# Patient Record
Sex: FEMALE | Race: WHITE | NOT HISPANIC OR LATINO | Employment: UNEMPLOYED | URBAN - NONMETROPOLITAN AREA
[De-identification: names, ages, dates, MRNs, and addresses within clinical notes are randomized per-mention and may not be internally consistent; named-entity substitution may affect disease eponyms.]

---

## 2021-04-30 LAB
EXTERNAL CHLAMYDIA SCREEN: NEGATIVE
EXTERNAL GONORRHEA SCREEN: NEGATIVE

## 2021-05-17 LAB
EXTERNAL ANTIBODY SCREEN: NEGATIVE
EXTERNAL HEPATITIS B SURFACE ANTIGEN: NEGATIVE
EXTERNAL HEPATITIS C AB: NORMAL
EXTERNAL SYPHILIS ANTIBODY: NORMAL
EXTERNAL SYPHILIS RPR SCREEN: NORMAL
HIV1 P24 AG SERPL QL IA: NORMAL

## 2021-10-27 LAB — EXTERNAL GROUP B STREP ANTIGEN: NEGATIVE

## 2021-10-28 ENCOUNTER — HOSPITAL ENCOUNTER (OUTPATIENT)
Facility: HOSPITAL | Age: 21
Setting detail: OBSERVATION
Discharge: HOME | End: 2021-10-28
Attending: OBSTETRICS & GYNECOLOGY | Admitting: OBSTETRICS & GYNECOLOGY

## 2021-10-28 VITALS
HEART RATE: 142 BPM | HEIGHT: 61 IN | WEIGHT: 113 LBS | BODY MASS INDEX: 21.34 KG/M2 | TEMPERATURE: 98.5 F | SYSTOLIC BLOOD PRESSURE: 107 MMHG | DIASTOLIC BLOOD PRESSURE: 84 MMHG | RESPIRATION RATE: 18 BRPM

## 2021-10-28 PROCEDURE — G0378 HOSPITAL OBSERVATION PER HR: HCPCS

## 2021-10-28 PROCEDURE — G0463 HOSPITAL OUTPT CLINIC VISIT: HCPCS

## 2021-10-28 RX ORDER — PRENATAL VIT NO.126/IRON/FOLIC 28MG-0.8MG
1 TABLET ORAL DAILY
COMMUNITY

## 2021-10-28 RX ORDER — FERROUS SULFATE 325(65) MG
325 TABLET ORAL
COMMUNITY

## 2021-10-29 PROBLEM — Z34.90 PREGNANCY: Status: ACTIVE | Noted: 2021-10-29

## 2021-11-02 ENCOUNTER — HOSPITAL ENCOUNTER (OUTPATIENT)
Facility: HOSPITAL | Age: 21
Discharge: HOME | End: 2021-11-02
Attending: OBSTETRICS & GYNECOLOGY | Admitting: OBSTETRICS & GYNECOLOGY

## 2021-11-02 VITALS
TEMPERATURE: 98.3 F | HEART RATE: 88 BPM | RESPIRATION RATE: 18 BRPM | SYSTOLIC BLOOD PRESSURE: 119 MMHG | DIASTOLIC BLOOD PRESSURE: 69 MMHG

## 2021-11-02 LAB — A1 MICROGLOB PLACENTAL VAG QL: NEGATIVE

## 2021-11-02 PROCEDURE — 84112 EVAL AMNIOTIC FLUID PROTEIN: CPT | Performed by: OBSTETRICS & GYNECOLOGY

## 2021-11-02 PROCEDURE — G0378 HOSPITAL OBSERVATION PER HR: HCPCS

## 2021-11-02 PROCEDURE — G0463 HOSPITAL OUTPT CLINIC VISIT: HCPCS

## 2021-11-06 ENCOUNTER — NURSE TRIAGE (OUTPATIENT)
Dept: CALL CENTER | Facility: HOSPITAL | Age: 21
End: 2021-11-06

## 2021-11-06 NOTE — TELEPHONE ENCOUNTER
"    Reason for Disposition  • Health Information question, no triage required and triager able to answer question    Additional Information  • Negative: [1] Caller is not with the adult (patient) AND [2] reporting urgent symptoms  • Negative: Lab result questions  • Negative: Medication questions  • Negative: Caller can't be reached by phone  • Negative: Caller has already spoken to PCP or another triager  • Negative: RN needs further essential information from caller in order to complete triage  • Negative: Requesting regular office appointment  • Negative: [1] Caller requesting NON-URGENT health information AND [2] PCP's office is the best resource    Answer Assessment - Initial Assessment Questions  1. REASON FOR CALL or QUESTION: \"What is your reason for calling today?\" or \"How can I best help you?\" or \"What question do you have that I can help answer?\"      Caller is asking how to sign up for an epidural.  Called Labor and Delivery and they state no sign up is necessary.  She will get an epidural if she wants one just come on in when it is time.    Protocols used: INFORMATION ONLY CALL - NO TRIAGE-ADULT-      "

## 2021-11-26 ENCOUNTER — HOSPITAL ENCOUNTER (OUTPATIENT)
Facility: HOSPITAL | Age: 21
Discharge: HOME | End: 2021-11-26
Attending: OBSTETRICS & GYNECOLOGY | Admitting: OBSTETRICS & GYNECOLOGY

## 2021-11-26 VITALS
TEMPERATURE: 97.4 F | HEIGHT: 61 IN | WEIGHT: 124.4 LBS | RESPIRATION RATE: 18 BRPM | HEART RATE: 84 BPM | SYSTOLIC BLOOD PRESSURE: 117 MMHG | DIASTOLIC BLOOD PRESSURE: 72 MMHG | BODY MASS INDEX: 23.49 KG/M2

## 2021-11-26 PROCEDURE — G0378 HOSPITAL OBSERVATION PER HR: HCPCS

## 2021-11-26 PROCEDURE — 59025 FETAL NON-STRESS TEST: CPT

## 2021-11-26 PROCEDURE — G0463 HOSPITAL OUTPT CLINIC VISIT: HCPCS

## 2021-11-30 ENCOUNTER — TRANSCRIBE ORDERS (OUTPATIENT)
Dept: LAB | Facility: HOSPITAL | Age: 21
End: 2021-11-30

## 2021-11-30 DIAGNOSIS — Z01.818 PREOPERATIVE TESTING: Primary | ICD-10-CM

## 2021-12-01 ENCOUNTER — ANESTHESIA EVENT (OUTPATIENT)
Dept: LABOR AND DELIVERY | Facility: HOSPITAL | Age: 21
End: 2021-12-01

## 2021-12-01 ENCOUNTER — LAB (OUTPATIENT)
Dept: LAB | Facility: HOSPITAL | Age: 21
End: 2021-12-01

## 2021-12-01 ENCOUNTER — HOSPITAL ENCOUNTER (INPATIENT)
Facility: HOSPITAL | Age: 21
LOS: 2 days | Discharge: HOME | End: 2021-12-03
Attending: OBSTETRICS & GYNECOLOGY | Admitting: OBSTETRICS & GYNECOLOGY

## 2021-12-01 ENCOUNTER — ANESTHESIA (OUTPATIENT)
Dept: LABOR AND DELIVERY | Facility: HOSPITAL | Age: 21
End: 2021-12-01

## 2021-12-01 DIAGNOSIS — Z98.891 STATUS POST PRIMARY LOW TRANSVERSE CESAREAN SECTION: Primary | ICD-10-CM

## 2021-12-01 LAB
ABO GROUP BLD: NORMAL
BLD GP AB SCN SERPL QL: NEGATIVE
DEPRECATED RDW RBC AUTO: 51.8 FL (ref 37–54)
ERYTHROCYTE [DISTWIDTH] IN BLOOD BY AUTOMATED COUNT: 15.7 % (ref 12.3–15.4)
HCT VFR BLD AUTO: 34 % (ref 34–46.6)
HGB BLD-MCNC: 11.6 G/DL (ref 12–15.9)
MCH RBC QN AUTO: 31 PG (ref 26.6–33)
MCHC RBC AUTO-ENTMCNC: 34.1 G/DL (ref 31.5–35.7)
MCV RBC AUTO: 90.9 FL (ref 79–97)
PLATELET # BLD AUTO: 274 10*3/MM3 (ref 140–450)
PMV BLD AUTO: 9.4 FL (ref 6–12)
RBC # BLD AUTO: 3.74 10*6/MM3 (ref 3.77–5.28)
RH BLD: POSITIVE
SARS-COV-2 RNA PNL SPEC NAA+PROBE: NOT DETECTED
T&S EXPIRATION DATE: NORMAL
WBC NRBC COR # BLD: 7.49 10*3/MM3 (ref 3.4–10.8)

## 2021-12-01 PROCEDURE — 51702 INSERT TEMP BLADDER CATH: CPT

## 2021-12-01 PROCEDURE — 25010000002 BUTORPHANOL PER 1 MG: Performed by: NURSE ANESTHETIST, CERTIFIED REGISTERED

## 2021-12-01 PROCEDURE — 25010000002 ONDANSETRON PER 1 MG: Performed by: NURSE ANESTHETIST, CERTIFIED REGISTERED

## 2021-12-01 PROCEDURE — 86900 BLOOD TYPING SEROLOGIC ABO: CPT | Performed by: OBSTETRICS & GYNECOLOGY

## 2021-12-01 PROCEDURE — 86850 RBC ANTIBODY SCREEN: CPT | Performed by: OBSTETRICS & GYNECOLOGY

## 2021-12-01 PROCEDURE — 25010000002 CEFAZOLIN PER 500 MG: Performed by: OBSTETRICS & GYNECOLOGY

## 2021-12-01 PROCEDURE — 86901 BLOOD TYPING SEROLOGIC RH(D): CPT | Performed by: OBSTETRICS & GYNECOLOGY

## 2021-12-01 PROCEDURE — 87635 SARS-COV-2 COVID-19 AMP PRB: CPT | Performed by: OBSTETRICS & GYNECOLOGY

## 2021-12-01 PROCEDURE — 25010000002 KETOROLAC TROMETHAMINE PER 15 MG: Performed by: NURSE ANESTHETIST, CERTIFIED REGISTERED

## 2021-12-01 PROCEDURE — C9803 HOPD COVID-19 SPEC COLLECT: HCPCS | Performed by: OBSTETRICS & GYNECOLOGY

## 2021-12-01 PROCEDURE — 25010000002 HYDROMORPHONE 1 MG/ML SOLUTION: Performed by: NURSE ANESTHETIST, CERTIFIED REGISTERED

## 2021-12-01 PROCEDURE — 85027 COMPLETE CBC AUTOMATED: CPT | Performed by: OBSTETRICS & GYNECOLOGY

## 2021-12-01 RX ORDER — TRISODIUM CITRATE DIHYDRATE AND CITRIC ACID MONOHYDRATE 500; 334 MG/5ML; MG/5ML
30 SOLUTION ORAL ONCE
Status: COMPLETED | OUTPATIENT
Start: 2021-12-01 | End: 2021-12-01

## 2021-12-01 RX ORDER — BUTORPHANOL TARTRATE 1 MG/ML
0.5 INJECTION, SOLUTION INTRAMUSCULAR; INTRAVENOUS EVERY 6 HOURS PRN
Status: DISCONTINUED | OUTPATIENT
Start: 2021-12-01 | End: 2021-12-03 | Stop reason: HOSPADM

## 2021-12-01 RX ORDER — MORPHINE SULFATE 2 MG/ML
2 INJECTION, SOLUTION INTRAMUSCULAR; INTRAVENOUS
Status: ACTIVE | OUTPATIENT
Start: 2021-12-01 | End: 2021-12-02

## 2021-12-01 RX ORDER — ONDANSETRON 2 MG/ML
4 INJECTION INTRAMUSCULAR; INTRAVENOUS ONCE AS NEEDED
Status: DISCONTINUED | OUTPATIENT
Start: 2021-12-01 | End: 2021-12-03 | Stop reason: HOSPADM

## 2021-12-01 RX ORDER — ONDANSETRON 2 MG/ML
INJECTION INTRAMUSCULAR; INTRAVENOUS AS NEEDED
Status: DISCONTINUED | OUTPATIENT
Start: 2021-12-01 | End: 2021-12-01 | Stop reason: SURG

## 2021-12-01 RX ORDER — ONDANSETRON 4 MG/1
4 TABLET, FILM COATED ORAL EVERY 6 HOURS PRN
Status: DISCONTINUED | OUTPATIENT
Start: 2021-12-01 | End: 2021-12-01 | Stop reason: HOSPADM

## 2021-12-01 RX ORDER — MORPHINE SULFATE 2 MG/ML
2 INJECTION, SOLUTION INTRAMUSCULAR; INTRAVENOUS EVERY 4 HOURS PRN
Status: DISCONTINUED | OUTPATIENT
Start: 2021-12-02 | End: 2021-12-03 | Stop reason: HOSPADM

## 2021-12-01 RX ORDER — SODIUM CHLORIDE 0.9 % (FLUSH) 0.9 %
10 SYRINGE (ML) INJECTION AS NEEDED
Status: DISCONTINUED | OUTPATIENT
Start: 2021-12-01 | End: 2021-12-01 | Stop reason: HOSPADM

## 2021-12-01 RX ORDER — OXYCODONE AND ACETAMINOPHEN 10; 325 MG/1; MG/1
1 TABLET ORAL EVERY 4 HOURS PRN
Status: DISCONTINUED | OUTPATIENT
Start: 2021-12-02 | End: 2021-12-03 | Stop reason: HOSPADM

## 2021-12-01 RX ORDER — SODIUM CHLORIDE 0.9 % (FLUSH) 0.9 %
3 SYRINGE (ML) INJECTION EVERY 12 HOURS SCHEDULED
Status: DISCONTINUED | OUTPATIENT
Start: 2021-12-01 | End: 2021-12-01 | Stop reason: HOSPADM

## 2021-12-01 RX ORDER — MISOPROSTOL 200 UG/1
800 TABLET ORAL AS NEEDED
Status: DISCONTINUED | OUTPATIENT
Start: 2021-12-01 | End: 2021-12-01 | Stop reason: HOSPADM

## 2021-12-01 RX ORDER — PROMETHAZINE HYDROCHLORIDE 25 MG/1
12.5 TABLET ORAL EVERY 6 HOURS PRN
Status: DISCONTINUED | OUTPATIENT
Start: 2021-12-01 | End: 2021-12-01 | Stop reason: HOSPADM

## 2021-12-01 RX ORDER — CARBOPROST TROMETHAMINE 250 UG/ML
250 INJECTION, SOLUTION INTRAMUSCULAR AS NEEDED
Status: DISCONTINUED | OUTPATIENT
Start: 2021-12-01 | End: 2021-12-01 | Stop reason: HOSPADM

## 2021-12-01 RX ORDER — ONDANSETRON 2 MG/ML
4 INJECTION INTRAMUSCULAR; INTRAVENOUS EVERY 6 HOURS PRN
Status: DISCONTINUED | OUTPATIENT
Start: 2021-12-01 | End: 2021-12-01 | Stop reason: HOSPADM

## 2021-12-01 RX ORDER — SIMETHICONE 80 MG
80 TABLET,CHEWABLE ORAL 4 TIMES DAILY PRN
Status: DISCONTINUED | OUTPATIENT
Start: 2021-12-01 | End: 2021-12-03 | Stop reason: HOSPADM

## 2021-12-01 RX ORDER — OXYTOCIN/0.9 % SODIUM CHLORIDE 30/500 ML
250 PLASTIC BAG, INJECTION (ML) INTRAVENOUS CONTINUOUS
Status: ACTIVE | OUTPATIENT
Start: 2021-12-01 | End: 2021-12-01

## 2021-12-01 RX ORDER — SODIUM CHLORIDE, SODIUM LACTATE, POTASSIUM CHLORIDE, CALCIUM CHLORIDE 600; 310; 30; 20 MG/100ML; MG/100ML; MG/100ML; MG/100ML
125 INJECTION, SOLUTION INTRAVENOUS CONTINUOUS
Status: DISCONTINUED | OUTPATIENT
Start: 2021-12-01 | End: 2021-12-03 | Stop reason: HOSPADM

## 2021-12-01 RX ORDER — METHYLERGONOVINE MALEATE 0.2 MG/ML
200 INJECTION INTRAVENOUS AS NEEDED
Status: DISCONTINUED | OUTPATIENT
Start: 2021-12-01 | End: 2021-12-01 | Stop reason: HOSPADM

## 2021-12-01 RX ORDER — NALOXONE HCL 0.4 MG/ML
0.4 VIAL (ML) INJECTION
Status: DISCONTINUED | OUTPATIENT
Start: 2021-12-01 | End: 2021-12-03 | Stop reason: HOSPADM

## 2021-12-01 RX ORDER — ACETAMINOPHEN 325 MG/1
650 TABLET ORAL EVERY 6 HOURS PRN
Status: ACTIVE | OUTPATIENT
Start: 2021-12-01 | End: 2021-12-02

## 2021-12-01 RX ORDER — PROMETHAZINE HYDROCHLORIDE 12.5 MG/1
12.5 SUPPOSITORY RECTAL EVERY 6 HOURS PRN
Status: DISCONTINUED | OUTPATIENT
Start: 2021-12-01 | End: 2021-12-01 | Stop reason: HOSPADM

## 2021-12-01 RX ORDER — PROMETHAZINE HYDROCHLORIDE 25 MG/1
25 TABLET ORAL EVERY 6 HOURS PRN
Status: DISCONTINUED | OUTPATIENT
Start: 2021-12-01 | End: 2021-12-03 | Stop reason: HOSPADM

## 2021-12-01 RX ORDER — BUPIVACAINE HYDROCHLORIDE 7.5 MG/ML
INJECTION, SOLUTION EPIDURAL; RETROBULBAR AS NEEDED
Status: DISCONTINUED | OUTPATIENT
Start: 2021-12-01 | End: 2021-12-01 | Stop reason: SURG

## 2021-12-01 RX ORDER — BUPIVACAINE HCL/0.9 % NACL/PF 0.1 %
2 PLASTIC BAG, INJECTION (ML) EPIDURAL ONCE
Status: COMPLETED | OUTPATIENT
Start: 2021-12-01 | End: 2021-12-01

## 2021-12-01 RX ORDER — OXYTOCIN/0.9 % SODIUM CHLORIDE 30/500 ML
999 PLASTIC BAG, INJECTION (ML) INTRAVENOUS ONCE
Status: DISCONTINUED | OUTPATIENT
Start: 2021-12-01 | End: 2021-12-01 | Stop reason: HOSPADM

## 2021-12-01 RX ORDER — OXYCODONE HYDROCHLORIDE AND ACETAMINOPHEN 5; 325 MG/1; MG/1
1 TABLET ORAL EVERY 4 HOURS PRN
Status: DISCONTINUED | OUTPATIENT
Start: 2021-12-02 | End: 2021-12-03 | Stop reason: HOSPADM

## 2021-12-01 RX ORDER — HYDROMORPHONE HYDROCHLORIDE 1 MG/ML
0.5 INJECTION, SOLUTION INTRAMUSCULAR; INTRAVENOUS; SUBCUTANEOUS
Status: DISCONTINUED | OUTPATIENT
Start: 2021-12-01 | End: 2021-12-01 | Stop reason: HOSPADM

## 2021-12-01 RX ORDER — MORPHINE SULFATE 2 MG/ML
2 INJECTION, SOLUTION INTRAMUSCULAR; INTRAVENOUS
Status: DISCONTINUED | OUTPATIENT
Start: 2021-12-01 | End: 2021-12-01

## 2021-12-01 RX ORDER — LIDOCAINE 50 MG/G
1 PATCH TOPICAL
Status: DISCONTINUED | OUTPATIENT
Start: 2021-12-01 | End: 2021-12-03 | Stop reason: HOSPADM

## 2021-12-01 RX ORDER — PROMETHAZINE HYDROCHLORIDE 12.5 MG/1
12.5 SUPPOSITORY RECTAL EVERY 6 HOURS PRN
Status: DISCONTINUED | OUTPATIENT
Start: 2021-12-01 | End: 2021-12-03 | Stop reason: HOSPADM

## 2021-12-01 RX ORDER — OXYTOCIN/0.9 % SODIUM CHLORIDE 30/500 ML
125 PLASTIC BAG, INJECTION (ML) INTRAVENOUS CONTINUOUS PRN
Status: DISCONTINUED | OUTPATIENT
Start: 2021-12-01 | End: 2021-12-01 | Stop reason: HOSPADM

## 2021-12-01 RX ORDER — KETOROLAC TROMETHAMINE 30 MG/ML
30 INJECTION, SOLUTION INTRAMUSCULAR; INTRAVENOUS EVERY 6 HOURS PRN
Status: DISPENSED | OUTPATIENT
Start: 2021-12-01 | End: 2021-12-02

## 2021-12-01 RX ORDER — FAMOTIDINE 10 MG/ML
20 INJECTION, SOLUTION INTRAVENOUS ONCE AS NEEDED
Status: COMPLETED | OUTPATIENT
Start: 2021-12-01 | End: 2021-12-01

## 2021-12-01 RX ORDER — OXYCODONE AND ACETAMINOPHEN 7.5; 325 MG/1; MG/1
1 TABLET ORAL EVERY 4 HOURS PRN
Status: DISPENSED | OUTPATIENT
Start: 2021-12-01 | End: 2021-12-02

## 2021-12-01 RX ORDER — OXYCODONE AND ACETAMINOPHEN 7.5; 325 MG/1; MG/1
2 TABLET ORAL EVERY 4 HOURS PRN
Status: ACTIVE | OUTPATIENT
Start: 2021-12-01 | End: 2021-12-02

## 2021-12-01 RX ORDER — OXYTOCIN 10 [USP'U]/ML
INJECTION, SOLUTION INTRAMUSCULAR; INTRAVENOUS AS NEEDED
Status: DISCONTINUED | OUTPATIENT
Start: 2021-12-01 | End: 2021-12-01 | Stop reason: SURG

## 2021-12-01 RX ORDER — KETOROLAC TROMETHAMINE 30 MG/ML
30 INJECTION, SOLUTION INTRAMUSCULAR; INTRAVENOUS 4 TIMES DAILY
Status: DISCONTINUED | OUTPATIENT
Start: 2021-12-01 | End: 2021-12-01 | Stop reason: SDUPTHER

## 2021-12-01 RX ORDER — LIDOCAINE HYDROCHLORIDE 10 MG/ML
5 INJECTION, SOLUTION EPIDURAL; INFILTRATION; INTRACAUDAL; PERINEURAL AS NEEDED
Status: DISCONTINUED | OUTPATIENT
Start: 2021-12-01 | End: 2021-12-01 | Stop reason: HOSPADM

## 2021-12-01 RX ORDER — IBUPROFEN 600 MG/1
600 TABLET ORAL EVERY 6 HOURS PRN
Status: DISCONTINUED | OUTPATIENT
Start: 2021-12-02 | End: 2021-12-03 | Stop reason: HOSPADM

## 2021-12-01 RX ADMIN — KETOROLAC TROMETHAMINE 30 MG: 30 INJECTION, SOLUTION INTRAMUSCULAR; INTRAVENOUS at 19:49

## 2021-12-01 RX ADMIN — BUPIVACAINE HYDROCHLORIDE 1.4 ML: 7.5 INJECTION, SOLUTION EPIDURAL; RETROBULBAR at 12:30

## 2021-12-01 RX ADMIN — HYDROMORPHONE HYDROCHLORIDE 100 MCG: 1 INJECTION, SOLUTION INTRAMUSCULAR; INTRAVENOUS; SUBCUTANEOUS at 12:30

## 2021-12-01 RX ADMIN — ONDANSETRON 4 MG: 2 INJECTION INTRAMUSCULAR; INTRAVENOUS at 12:27

## 2021-12-01 RX ADMIN — SODIUM CITRATE AND CITRIC ACID MONOHYDRATE 30 ML: 500; 334 SOLUTION ORAL at 11:48

## 2021-12-01 RX ADMIN — HYDROMORPHONE HYDROCHLORIDE 900 MCG: 1 INJECTION, SOLUTION INTRAMUSCULAR; INTRAVENOUS; SUBCUTANEOUS at 13:00

## 2021-12-01 RX ADMIN — SODIUM CHLORIDE, POTASSIUM CHLORIDE, SODIUM LACTATE AND CALCIUM CHLORIDE 125 ML/HR: 600; 310; 30; 20 INJECTION, SOLUTION INTRAVENOUS at 10:02

## 2021-12-01 RX ADMIN — OXYCODONE AND ACETAMINOPHEN 1 TABLET: 7.5; 325 TABLET ORAL at 22:30

## 2021-12-01 RX ADMIN — FAMOTIDINE 20 MG: 10 INJECTION INTRAVENOUS at 11:48

## 2021-12-01 RX ADMIN — OXYTOCIN 30 UNITS: 10 INJECTION, SOLUTION INTRAMUSCULAR; INTRAVENOUS at 12:54

## 2021-12-01 RX ADMIN — BUTORPHANOL TARTRATE 0.5 MG: 1 INJECTION, SOLUTION INTRAMUSCULAR; INTRAVENOUS at 17:15

## 2021-12-01 RX ADMIN — Medication 2 G: at 12:26

## 2021-12-01 NOTE — OP NOTE
Section Procedure Note    Indications: failure to progress: arrest of descent, failure to progress: arrest of dilation and macrosomia    Pre-operative Diagnosis: 41 week 0 day pregnancy.    Post-operative Diagnosis: same    Surgeon: Odilon Rosenberg MD     Assistants: n/a    Anesthesia: Spinal anesthesia    ASA Class: 1    Procedure Details   The patient was seen in the Holding Room. The risks, benefits, complications, treatment options, and expected outcomes were discussed with the patient.  The patient concurred with the proposed plan, giving informed consent.  The site of surgery properly noted/marked. The patient was taken to Operating Room # 1, identified as Emmy Llamas and the procedure verified as  Delivery. A Time Out was held and the above information confirmed.    After induction of anesthesia, the patient was draped and prepped in the usual sterile manner. A Pfannenstiel incision was made and carried down through the subcutaneous tissue to the fascia. Fascial incision was made and extended transversely. The fascia was  from the underlying rectus tissue superiorly and inferiorly. The peritoneum was identified and entered. Peritoneal incision was extended longitudinally. The utero-vesical peritoneal reflection was incised transversely and the bladder flap was bluntly freed from the lower uterine segment. A low transverse uterine incision was made. Delivered from vertex presentation was a female  gram n/a with Apgar scores of 8 at one minute and 9 at five minutes. After the umbilical cord was clamped and cut cord blood was obtained for evaluation. The placenta was removed intact and appeared normal. The uterine outline, tubes and ovaries appeared normal. The uterine incision was closed with running locked sutures of 0 Vicryl. Hemostasis was observed. Lavage was carried out until clear. The fascia was then reapproximated with running sutures of 0 Vicryl. The skin was  reapproximated with staples.    Instrument, sponge, and needle counts were correct prior the abdominal closure and at the conclusion of the case.     Findings:  Female infant, apgar 8-9, tube and ovaries nl, placenta intact , 3 vessels    Estimated Blood Loss:  600 mls           Drains: n/a           Total IV Fluids: n/aml           Specimens: n/a           Implants: none           Complications:  None; patient tolerated the procedure well.           Disposition: PACU - hemodynamically stable.           Condition: stable    Attending Attestation: I performed the procedure.

## 2021-12-01 NOTE — ANESTHESIA PROCEDURE NOTES
Spinal Block      Patient reassessed immediately prior to procedure    Patient location during procedure: OB  Start Time: 12/1/2021 12:26 PM  Stop Time: 12/1/2021 12:30 PM  Indication:procedure for pain  Performed By  CRNA: Umang Godinez CRNA  Preanesthetic Checklist  Completed: patient identified, IV checked, site marked, risks and benefits discussed, surgical consent, monitors and equipment checked, pre-op evaluation and timeout performed  Spinal Block Prep:  Patient Position:sitting  Sterile Tech:gloves, mask and sterile barriers  Prep:Chloraprep  Patient Monitoring:blood pressure monitoring and continuous pulse oximetry  Spinal Block Procedure  Approach:midline  Guidance:palpation technique  Location:L4-L5  Needle Type:Pencan  Needle Gauge:25 G  Placement of Spinal needle event:cerebrospinal fluid aspirated  Paresthesia: no  Fluid Appearance:clear     Post Assessment  Patient Tolerance:patient tolerated the procedure well with no apparent complications  Complications no

## 2021-12-01 NOTE — H&P
"[facility]   Emmy Llamas  : 2000  MRN: [id number]  CSN: [id number]    History and Physical    Subjective   Emmy Llamas is a 21 y.o.  who presented to labor and delivery for elective primary c/s. Pt is 41 weeks, efw 3600 grams and head is still out of the pelvis. The pt s condition was discussed with pt and family, rba given, choices given, pt declines induction and planning a primary c/s due to head has not entered the pelvic bone.    Past Medical History:   Diagnosis Date   • Dizziness      History reviewed. No pertinent surgical history.  Social History    Tobacco Use      Smoking status: Never Smoker      Smokeless tobacco: Not on file      Current Facility-Administered Medications:   •  ceFAZolin in 0.9% normal saline (ANCEF) IVPB solution 2 g, 2 g, Intravenous, Once, Odilon Rosenberg MD  •  lactated ringers bolus 1,000 mL, 1,000 mL, Intravenous, Once, Odilon Rosenberg MD  •  lactated ringers infusion, 125 mL/hr, Intravenous, Continuous, Odilon Rosenberg MD, Last Rate: 999 mL/hr at 21 1147, 999 mL/hr at 21 1147  •  lidocaine (LIDODERM) 5 % 1 patch, 1 patch, Transdermal, Q24H, Odilon Rosenebrg MD  •  lidocaine PF 1% (XYLOCAINE) injection 5 mL, 5 mL, Intradermal, PRN, Odilon Rosenberg MD  •  sodium chloride 0.9 % flush 10 mL, 10 mL, Intravenous, PRN, Odilon Rosenberg MD  •  sodium chloride 0.9 % flush 3 mL, 3 mL, Intravenous, Q12H, Odilon Rosenberg MD    No Known Allergies    Review of Systems      Objective   Ht 154.9 cm (61\")   Wt 55.3 kg (122 lb)   Breastfeeding Yes   BMI 23.05 kg/m²   General: well developed; well nourished  no acute distress   Heart: regular rate and rhythm, S1, S2 normal, no murmur, click, rub or gallop   Lungs: breathing is unlabored   Abdomen: soft, non-tender; no masses  no umbilical or inguinal hernias are present  no hepato-splenomegaly   Pelvis:: Clinical staff was present for exam, cervix long closed, floating presenting part "   Labs  Lab Results   Component Value Date     12/01/2021    HGB 11.6 (L) 12/01/2021    HCT 34.0 12/01/2021    WBC 7.49 12/01/2021        Assessment   1. 41 week gestation with cephalo-pelvic disproportion    The risks, benefits, and alternatives of the procedure; along with the risks of anesthesia was discussed in full with the patient and family and all questions were answered.       Plan   1. Primary c/s    Odilon Rosenberg MD  12/1/2021  11:54 CST

## 2021-12-01 NOTE — ANESTHESIA PREPROCEDURE EVALUATION
Anesthesia Evaluation     Patient summary reviewed and Nursing notes reviewed   NPO Solid Status: > 8 hours  NPO Liquid Status: > 8 hours           Airway   Mallampati: I  TM distance: >3 FB  Neck ROM: full  No difficulty expected  Dental - normal exam     Pulmonary - negative pulmonary ROS   Cardiovascular - negative cardio ROS        Neuro/Psych- negative ROS  GI/Hepatic/Renal/Endo - negative ROS     Musculoskeletal (-) negative ROS    Abdominal    Substance History - negative use     OB/GYN    (+) Pregnant,         Other                      Anesthesia Plan    ASA 2     spinal       Anesthetic plan, all risks, benefits, and alternatives have been provided, discussed and informed consent has been obtained with: patient.  Use of blood products discussed with patient  Consented to blood products.

## 2021-12-01 NOTE — LACTATION NOTE
Mother's Name: Emmy  Phone #:  Infant Name: Marli  :2022  Gestation:40w6d  Day of life:0  Birth weight:  8-12.7 (3990g) Discharge weight:  Weight Loss:   24 hour Summary of Feeds:  Voids:  Stools:  Assistive devices (shields, shells, etc):16 mm nipple shield  Significant Maternal history:, dizziness  Maternal Concerns:  denies  Maternal Goal: unsure of goals  Mother's Medications: Iron, PNV  Breastpump for home: NEEDS RX   Ped follow up appt:    Called to LDR recovery to assist with first feeding. ADT RN reports infant has fed for 10 mins on first breast but difficulty latching on second breast. Assisted to position infant in ventral and football hold but infant unable to maintain latch. Breast round and taunt, difficult for infant to stay latched. Moved to cross cradle hold and nipple shield applied. Infant latched and successfully nursed for 10 mins before self releasing from breast. Burped and moved infant back to left breast in football hold with shield in place. Infant nursed additional 10 mins, self released and presents with satiety cues. Moved infant to rest skin to skin on mother's chest. Mother difficulty focusing on education provided. Education directed mainly towards FOB and mother's step mom. Breastfeeding book also given. Reiterated importance of on demand feedings, waking to offer feedings every 3 hours, expected voids/stools, encouraged hand expression and skin to skin bonding/feedings. Questions denied at this time. Encouragement and support provided.       Instructed mom our lactation team is here for continued support throughout their breastfeeding journey. Our team has encouraged mom to call with any questions or concerns that may arise after discharge.    1700  Called back to room for assistance with feeding. Assisted to unswaddle infant, stooled diaper changed and moved infant to right breast in football hold with nipple shield in place. Infant eager. Minimal effort required  to achieve latch. Infant nursed well for 10 mins, gulping swallows noted at times with deep jaw dropping sucks. Moved to left breast, infant had 3 episodes of pushing away from breast, 2 large burps exhibited and then infant would go back to breast. Total of 18 mins on left breast with signs of great feeding. Infant released from breast, calm but alert. Assisted to swaddle infant, infant began refluxing, assisted infant to recover and then placed back in crib. Mother remains groggy, difficult to teach. Highly encouraged mother to attempt sitting up in chair when safe to ambulate, to breastfeed more independently. Also encouraged parents to watch youtube videos 1-3 tonight.

## 2021-12-02 LAB
HCT VFR BLD AUTO: 29.2 % (ref 34–46.6)
HGB BLD-MCNC: 9.4 G/DL (ref 12–15.9)

## 2021-12-02 PROCEDURE — 25010000002 IRON SUCROSE PER 1 MG: Performed by: OBSTETRICS & GYNECOLOGY

## 2021-12-02 PROCEDURE — 85014 HEMATOCRIT: CPT | Performed by: OBSTETRICS & GYNECOLOGY

## 2021-12-02 PROCEDURE — 25010000002 KETOROLAC TROMETHAMINE PER 15 MG: Performed by: NURSE ANESTHETIST, CERTIFIED REGISTERED

## 2021-12-02 PROCEDURE — 85018 HEMOGLOBIN: CPT | Performed by: OBSTETRICS & GYNECOLOGY

## 2021-12-02 RX ADMIN — SODIUM CHLORIDE, POTASSIUM CHLORIDE, SODIUM LACTATE AND CALCIUM CHLORIDE 125 ML/HR: 600; 310; 30; 20 INJECTION, SOLUTION INTRAVENOUS at 01:44

## 2021-12-02 RX ADMIN — OXYCODONE AND ACETAMINOPHEN 1 TABLET: 7.5; 325 TABLET ORAL at 10:34

## 2021-12-02 RX ADMIN — KETOROLAC TROMETHAMINE 30 MG: 30 INJECTION, SOLUTION INTRAMUSCULAR; INTRAVENOUS at 05:20

## 2021-12-02 RX ADMIN — IRON SUCROSE 400 MG: 20 INJECTION, SOLUTION INTRAVENOUS at 15:47

## 2021-12-02 RX ADMIN — OXYCODONE HYDROCHLORIDE AND ACETAMINOPHEN 1 TABLET: 5; 325 TABLET ORAL at 15:47

## 2021-12-02 RX ADMIN — LIDOCAINE 1 PATCH: 50 PATCH CUTANEOUS at 10:34

## 2021-12-02 RX ADMIN — OXYCODONE AND ACETAMINOPHEN 1 TABLET: 7.5; 325 TABLET ORAL at 05:20

## 2021-12-02 RX ADMIN — OXYCODONE AND ACETAMINOPHEN 1 TABLET: 325; 10 TABLET ORAL at 20:03

## 2021-12-02 RX ADMIN — IBUPROFEN 600 MG: 600 TABLET ORAL at 18:25

## 2021-12-02 NOTE — PLAN OF CARE
Goal Outcome Evaluation:  Plan of Care Reviewed With: patient, spouse        Progress: improving  Outcome Summary: VSS. ff/ml/uu. scant lochia. jordan cath in place. stadol given for itching. breastfeeding infant

## 2021-12-02 NOTE — LACTATION NOTE
"Mother's Name: Emmy  Phone #:  Infant Name: Marli  :2022  Gestation:40w6d  Day of life:1  Birth weight:  8-12.7 (3990g) Discharge weight:  Weight Loss: -1.58%  24 hour Summary of Feeds: 5BF  EBM 0.5 ml  Voids: 3 Stools:3  Assistive devices (shields, shells, etc):16 mm nipple shield  Significant Maternal history:, dizziness  Maternal Concerns:  denies  Maternal Goal: unsure of goals  Mother's Medications: Iron, PNV  Breastpump for home: NEEDS RX   Ped follow up appt:    Follow up with mother to discuss breastfeeding progress. Both parents state infant doing well breastfeeding, has been latching last 2-3 feedings without nipple shield. Last feeding at 1000, infant due to feed now. Encouraged feeding at this time and offered assistance/observation for feeding. Assisted to undress infant, waking then moved to mother. Mother independently positioned infant to left breast in cross cradle hold appropriate, praise provided. Mother attempts latching without shield, infant shallow latching, sucking lips inward causing \"smacking\" sounds. Multiple attempts made. Recommended applying shield. Infant able to maintain latch more easily with shield in place but mother having difficulty keeping infant supported at the breast. Reiterated many times to keep infant pressed firmly to breast with palm of hand into mid back, mother continuously allowing infant to slip back. Requiring assistance for support at breast. Moved to right breast in football hold. Mother keeping infant supported at breast better but still with frequent unlatching events. After 30 mins of attempting to feed, infant showing signs of fatigue, recommended hand expression at this time. Infant dressed and placed in crib, assisted mother with hand expression, collected 2.3 ml and provided to infant via syringe/finger suck training. Education provided to mother on use of hand pump although not necessary to use at this time. Recommend utilizing hand " expression to collect colostrum vs pumping. Parents deny questions. Encouragement and support provided.     Instructed mom our lactation team is here for continued support throughout their breastfeeding journey. Our team has encouraged mom to call with any questions or concerns that may arise after discharge.

## 2021-12-02 NOTE — ANESTHESIA POSTPROCEDURE EVALUATION
"Patient: Emmy Moya    Procedure Summary     Date: 21 Room / Location: [facility] LABOR DELIVERY  /  [facility] LABOR DELIVERY    Anesthesia Start: 1226 Anesthesia Stop:     Procedure: PRIMARY  SECTION (N/A Abdomen) Diagnosis: (OBSTRICTED LABOR DUE TO FETOPELVIC DISPROPORTION)    Surgeons: Odilon Rosenberg MD Provider: Umang Godinez CRNA    Anesthesia Type: spinal ASA Status: 2          Anesthesia Type: spinal    Vitals  Vitals Value Taken Time   /59 21 1158   Temp 98.3 °F (36.8 °C) 21 1158   Pulse 77 21 1158   Resp 18 21 1158   SpO2 99 % 21 1158           Post Anesthesia Care and Evaluation    Patient location during evaluation: PHASE II  Patient participation: complete - patient participated  Level of consciousness: awake and alert  Pain management: adequate  Airway patency: patent  Anesthetic complications: No anesthetic complications    Cardiovascular status: acceptable  Respiratory status: acceptable  Hydration status: acceptable    Comments: Blood pressure 117/59, pulse 77, temperature 98.3 °F (36.8 °C), temperature source Oral, resp. rate 18, height 154.9 cm (61\"), weight 55.3 kg (122 lb), SpO2 99 %, currently breastfeeding.    Pt discharged from PACU based on ana m score >8      "

## 2021-12-02 NOTE — PAYOR COMM NOTE
"REF:  [id number]    [facility]  [place],  [phone]  OR  FAX  [phone]     Rosario Moya (21 y.o. Female)             Date of Birth Social Security Number Address Home Phone MRN    2000  [address] [address] [phone] [id number]    Synagogue Marital Status             Mormon        Admission Date Admission Type Admitting Provider Attending Provider Department, Room/Bed    21 Elective Keith Medina MD Cardenas, Jorge M, MD [facility] MOTHER BABY 2A, M239/    Discharge Date Discharge Disposition Discharge Destination                         Attending Provider: Odilon Rosenberg MD    Allergies: No Known Allergies    Isolation: None   Infection: None   Code Status: CPR   Advance Care Planning Activity    Ht: 154.9 cm (61\")   Wt: 55.3 kg (122 lb)    Admission Cmt: None   Principal Problem: None                Active Insurance as of 2021     Primary Coverage     Payor Plan Insurance Group Employer/Plan Group    [place] [id number]     Payor Plan Address Payor Plan Phone Number Payor Plan Fax Number Effective Dates    [employer] [address] [address] [phone]  10/1/2021 - None Entered    [facility] [address]       Subscriber Name Subscriber Birth Date Member ID       ROSARIO MOYA 2000 [id number]                 Emergency Contacts      (Rel.) Home Phone Work Phone Mobile Phone    PAULINA DOSS (Spouse) [phone] -- --        EDC 2021    G-1 P-0     41 WEEKS GESTATIONAL AGE     2021 AT 12:52 PM         FEMALE   APGAR  8/9    WEIGHT 3990 GRAMS            History & Physical      Odilon Rosenberg MD at 21 1153           Anna Doss  : 2000  MRN: [id number]  CSN: [id number]    History and Physical    Subjective   Roasrio Doss is a 21 y.o.  who presented to labor and delivery for elective primary c/s. Pt is 41 weeks, " "efw 3600 grams and head is still out of the pelvis. The pt s condition was discussed with pt and family, rba given, choices given, pt declines induction and planning a primary c/s due to head has not entered the pelvic bone.    Past Medical History:   Diagnosis Date   • Dizziness      History reviewed. No pertinent surgical history.  Social History    Tobacco Use      Smoking status: Never Smoker      Smokeless tobacco: Not on file      Current Facility-Administered Medications:   •  ceFAZolin in 0.9% normal saline (ANCEF) IVPB solution 2 g, 2 g, Intravenous, Once, Odilon Rosenberg MD  •  lactated ringers bolus 1,000 mL, 1,000 mL, Intravenous, Once, Odilon Rosenberg MD  •  lactated ringers infusion, 125 mL/hr, Intravenous, Continuous, Odilon Rosenberg MD, Last Rate: 999 mL/hr at 12/01/21 1147, 999 mL/hr at 12/01/21 1147  •  lidocaine (LIDODERM) 5 % 1 patch, 1 patch, Transdermal, Q24H, Odilon Rosenberg MD  •  lidocaine PF 1% (XYLOCAINE) injection 5 mL, 5 mL, Intradermal, PRN, Odilon Rosenberg MD  •  sodium chloride 0.9 % flush 10 mL, 10 mL, Intravenous, PRN, Odilon Rosenberg MD  •  sodium chloride 0.9 % flush 3 mL, 3 mL, Intravenous, Q12H, Odilon Rosenberg MD    No Known Allergies    Review of Systems      Objective   Ht 154.9 cm (61\")   Wt 55.3 kg (122 lb)   Breastfeeding Yes   BMI 23.05 kg/m²   General: well developed; well nourished  no acute distress   Heart: regular rate and rhythm, S1, S2 normal, no murmur, click, rub or gallop   Lungs: breathing is unlabored   Abdomen: soft, non-tender; no masses  no umbilical or inguinal hernias are present  no hepato-splenomegaly   Pelvis:: Clinical staff was present for exam, cervix long closed, floating presenting part   Labs  Lab Results   Component Value Date     12/01/2021    HGB 11.6 (L) 12/01/2021    HCT 34.0 12/01/2021    WBC 7.49 12/01/2021        Assessment   1. 41 week gestation with cephalo-pelvic disproportion    The risks, benefits, and " alternatives of the procedure; along with the risks of anesthesia was discussed in full with the patient and family and all questions were answered.       Plan   1. Primary c/s    Odilon Rosenberg MD  2021  11:54 CST            Electronically signed by Odilon Rosenberg MD at 21 1158     H&P signed by [employer], [facility] at 21 1402      Scan on 2021 by [employer], [facility]: OFFICE VISIT, [facility], 2021          Electronically signed by [employer], [facility] at 21 1402          Operative/Procedure Notes (last 48 hours)      Odilon Rosenberg MD at 21 1314         Section Procedure Note    Indications: failure to progress: arrest of descent, failure to progress: arrest of dilation and macrosomia    Pre-operative Diagnosis: 41 week 0 day pregnancy.    Post-operative Diagnosis: same    Surgeon: Odilno Rosenberg MD     Assistants: n/a    Anesthesia: Spinal anesthesia    ASA Class: 1    Procedure Details   The patient was seen in the Holding Room. The risks, benefits, complications, treatment options, and expected outcomes were discussed with the patient.  The patient concurred with the proposed plan, giving informed consent.  The site of surgery properly noted/marked. The patient was taken to Operating Room # 1, identified as Emmy Llamas and the procedure verified as  Delivery. A Time Out was held and the above information confirmed.    After induction of anesthesia, the patient was draped and prepped in the usual sterile manner. A Pfannenstiel incision was made and carried down through the subcutaneous tissue to the fascia. Fascial incision was made and extended transversely. The fascia was  from the underlying rectus tissue superiorly and inferiorly. The peritoneum was identified and entered. Peritoneal incision was extended longitudinally. The utero-vesical peritoneal reflection was incised transversely and the bladder flap was bluntly  freed from the lower uterine segment. A low transverse uterine incision was made. Delivered from vertex presentation was a female  gram n/a with Apgar scores of 8 at one minute and 9 at five minutes. After the umbilical cord was clamped and cut cord blood was obtained for evaluation. The placenta was removed intact and appeared normal. The uterine outline, tubes and ovaries appeared normal. The uterine incision was closed with running locked sutures of 0 Vicryl. Hemostasis was observed. Lavage was carried out until clear. The fascia was then reapproximated with running sutures of 0 Vicryl. The skin was reapproximated with staples.    Instrument, sponge, and needle counts were correct prior the abdominal closure and at the conclusion of the case.     Findings:  Female infant, apgar 8-9, tube and ovaries nl, placenta intact , 3 vessels    Estimated Blood Loss:  600 mls           Drains: n/a           Total IV Fluids: n/aml           Specimens: n/a           Implants: none           Complications:  None; patient tolerated the procedure well.           Disposition: PACU - hemodynamically stable.           Condition: stable    Attending Attestation: I performed the procedure.            Electronically signed by Odilon Rosenberg MD at 21 3665

## 2021-12-02 NOTE — PROGRESS NOTES
Pt reports tolerating po fluids and starting to ambulate.   Labs and vitals noted   exam unremarkable  Plan advance care, iv iron

## 2021-12-02 NOTE — PLAN OF CARE
Problem: Adult Inpatient Plan of Care  Goal: Plan of Care Review  Outcome: Ongoing, Progressing  Flowsheets (Taken 12/2/2021 6413)  Progress: improving  Plan of Care Reviewed With:   patient   spouse  Outcome Summary:   Continue with POC   [id number] with scant lochia   Low ALT incision pressure dressing in place   not passing gas yet but bowel sounds active   last BM  12/1 per pt report.   Pain meds given as needed. No s/s of distress   patient breastfeeding with assist   pt. very inetrested in learning and asking many questions.  F/C patency maintained- urine dark gail but in adequate amount;    Goal Outcome Evaluation:  Plan of Care Reviewed With: patient, spouse

## 2021-12-03 VITALS
TEMPERATURE: 97.9 F | DIASTOLIC BLOOD PRESSURE: 67 MMHG | WEIGHT: 122 LBS | OXYGEN SATURATION: 99 % | HEART RATE: 65 BPM | RESPIRATION RATE: 18 BRPM | SYSTOLIC BLOOD PRESSURE: 114 MMHG | HEIGHT: 61 IN | BODY MASS INDEX: 23.03 KG/M2

## 2021-12-03 PROBLEM — Z34.90 PREGNANCY: Status: RESOLVED | Noted: 2021-10-29 | Resolved: 2021-12-03

## 2021-12-03 RX ORDER — OXYCODONE HYDROCHLORIDE AND ACETAMINOPHEN 5; 325 MG/1; MG/1
1 TABLET ORAL EVERY 6 HOURS PRN
Qty: 20 TABLET | Refills: 0 | Status: SHIPPED | OUTPATIENT
Start: 2021-12-03

## 2021-12-03 RX ORDER — IBUPROFEN 800 MG/1
800 TABLET ORAL EVERY 8 HOURS PRN
Qty: 20 TABLET | Refills: 0 | Status: SHIPPED | OUTPATIENT
Start: 2021-12-03

## 2021-12-03 RX ADMIN — OXYCODONE AND ACETAMINOPHEN 1 TABLET: 325; 10 TABLET ORAL at 04:15

## 2021-12-03 RX ADMIN — IBUPROFEN 600 MG: 600 TABLET ORAL at 04:15

## 2021-12-03 RX ADMIN — LIDOCAINE 1 PATCH: 50 PATCH CUTANEOUS at 10:15

## 2021-12-03 NOTE — PLAN OF CARE
Goal Outcome Evaluation:      Vss during shift. Voiding and ambulating. Pain controlled with PRN medication. Fundus firm midline uu.

## 2021-12-03 NOTE — PROGRESS NOTES
Reports doing well  Vitals noted  She received iron yesterday.  Counseling done. Plan discharge pt home, instructions given  Follow up set

## 2021-12-03 NOTE — CASE MANAGEMENT/SOCIAL WORK
Pt scored a 10 on the EPDS. Spoke with her and she was surprised she scored a little high. Discussed what to do if she starts having depression or anxiety. She denies concerns at this time and does not feel depressed or anxious. Pt has good family support to help her with the baby when needed. Pt has what she needs for the baby, including a carseat. Pt and baby will d/c home today.

## 2021-12-03 NOTE — PLAN OF CARE
Goal Outcome Evaluation:  Plan of Care Reviewed With: patient, spouse        Progress: improving  Outcome Summary: VSS. FF-1 scant lochia. Ambulating and voiding w/o difficulty. ALT incision RAMBO, C/D/I. Passing gas and active bowel sounds. EPDS 10,  consult ordered per protocol. Pain controlled w/ PO percocet and motrin. Resting in between care, breastfeeding well w/ minimal assistance. Bonding well w/ infant.

## 2021-12-03 NOTE — PLAN OF CARE
Goal Outcome Evaluation:         Vss during shift. Voiding and ambulating well. Fundus firm midline u1 with scant bleeding

## 2021-12-03 NOTE — LACTATION NOTE
Mother's Name: Emmy  Phone #: [phone]  Infant Name: Marli  : 2022  Gestation: 40w6d  Day of life: 2  Birth weight:  8-12.7 (3990g) Discharge weight: 8-6.6 (3815g)  Weight Loss: -4.38%  24 hour Summary of Feeds: 7 BF + EBM 7.2 ml  Voids: 2 Stools: 2  Assistive devices (shields, shells, etc): 16 mm nipple shield  Significant Maternal history: , dizziness  Maternal Concerns: None  Maternal Goal: Unsure  Mother's Medications: Iron, PNV  Breastpump for home: Yes  Ped follow up appt: Andrew in 2 weeks, NP Monday, 21    Patient reports infant is breastfeeding well. Reports strong tugging and swallows with feeds. Nipple pain/damage denied. Praise provided for breastfeeding success thus far. Gave and reviewed Breastfeeding after discharge packet. Discussed signs of effective milk transfer, skin to skin, engorgement, mastitis, etc. Recommended to continue feeding on demand, at least every 3 hours. Understanding verbalized. Questions/concerns denied.     Instructed mom our lactation team is here for continued support throughout their breastfeeding journey. Our team has encouraged mom to call with any questions or concerns that may arise after discharge.    Breastfeeding After Discharge  Signs of Milk: Fullness, firmness, heaviness of breasts, leaking of milk.  Signs of Good Feed: Breast fullness prior to feed, breasts soft and comfortable after feeding. Infant content after feeding: calm, sleepy, relaxed and without continued hunger cues.  Signs of Plugged Ducts, Engorgement and Mastitis: Plugged ducts (milk entrapment in milk ducts)- small tender knots that often feel like little beans under breast tissue, usually tender. Massage on these areas of concern while breastfeeding or pumping to promote emptying.   Engorgement- fluid or excess milk, breasts become uncomfortably full, tight, firm (compare to the firmness of your cheek (mild), chin (moderate) or forehead (severe). First line of treatment  should be to BREASTFEED, if breasts remain full feeling after a feeding, it may be necessary to pump, ONLY UNTIL BREASTS ARE SOFT AND COMFORTABLE. DO NOT OVER PUMP (complete emptying of breasts can trigger even more milk which will cause continued, recurrent Engorgement).  Mastitis- Infection of the breast tissue, most often caused by plugged ducts that are not adequately treated by emptying, recurrent trauma to nipples or breasts (cracked or bleeding nipples). Signs: redness, swelling, tender knots or fever to breasts as well as generalized fever >101 degrees F that is often sudden onset. Treatment of mastitis, BREASTFEED! Pump after breastfeeding to achieve COMPLETE emptying of affected breast, utilizing massage to areas of concern, may use cold compress to affected area only after breast emptying. May take anti-inflammatories i.e. Ibuprofen, Motrin. CALL your OB for assessment and continued treatment with Antibiotics to adequately treat mastitis.  Infant Care: Over the first 2 weeks it is important to keep record of infant's feeding routine (feeding times and durations), wet and dirty diaper frequency, stool color and any spit ups that may occur.  Keep in mind, ALL babies will lose some weight initially (usually no more than 10% by day 3). Until infant returns to/ surpasses birth weight (which can take up to 2 weeks), it is important to offer feedings AT LEAST EVERY 3 HOURS. Remember, if you choose to supplement infant with formula or previously pumped milk, you should always pump in replacement of that feeding in order to promote and maintain a healthy milk supply!  Maternal Care: REST, sleep when the infant sleeps, stay hydrated (water is optimal) drink to thirst, increase caloric intake - breastfeeding mother's need an ADDITIONAL 500 calories per day , eat 3 meals/day as well as snacks in between, limit CAFFIENE intake to 2 cups/day. Ask your significant other, family members or friends for help when needed,  taking advantage of meal trains, allowing others to help with laundry, house chores, etc can help you focus on what is most important early on after delivery… you and your infant, and breastfeeding!   Medications to CONTINUE: Prenatal Vitamins are important to continue taking while breastfeeding. Fish oil, magnesium/calcium supplements often are helpful to support Mothers and their milk supply as well. Tylenol, Ibuprofen, regular Zyrtec, Claritin are SAFE if you suffer from seasonal allergies. Flonase is safe and often an effective medication to take if suffering from sinus drainage/pressure.  Medications to AVOID: Benadryl, Sudafed, any medications including “DM” or have a drying effect to sinus drainage will also dry a mother's milk up. Birth control- your OB will want to address birth control options with you usually around 4-6 weeks postpartum, be sure to notify your MD if you continue to breastfeed as some birth controls may significantly decrease your milk supply. Herbals- some herbs may also decrease your milk supply: PEPPERMINT, MENTHOL in any form (candies, essential oils, teas, etc), so check labels and avoid using in excess.  Pumping: Although we encourage you to focus on breastfeeding over the first 2-4 weeks, you will need to plan to begin pumping. We do recommend implementing pumping by the time infant is 4 weeks old. Pump 2-3 times per day immediately AFTER breastfeeding, it is normal to collect very small amounts initially, but the more consistently you pump, the more you will begin to collect. Store collected milk in refrigerator or freezer. You should also begin offering infant a bottle around 4 weeks. Remember to use slow flow nipples and PACE the bottle-feed. A bottle feed should take about as long as a breastfeeding session.

## 2021-12-03 NOTE — DISCHARGE SUMMARY
Discharge Summary     Anna Moya  : 2000  MRN: [id number]  CSN: [id number]    Date of Admission: 2021   Date of Discharge:  12/3/2021   Delivering Physician: Odilon Rosenberg MD       Admission Diagnosis: 1. Status post primary low transverse  section [Z98.891]   Discharge Diagnosis: 1. Pregnancy at 41w1d - delivered       Procedures: 1. Primary      Hospital Course  See the completed operative report for details regarding antepartum course and delivery.    Her post-operative course was unremarkable.  On POD # 2 she felt like she ready ready for D/C.  She was evaluated by Dr. rosenberg who agreed she was able to be discharged to home.  She had no febrile morbidity. She had normal bowel and bladder function and was hemodynamically stable.  Her wound was healing well without obvious signs of infections.    Infant  female  fetus weighing 3990 g (8 lb 12.7 oz)   Apgars -  8 @ 1 minute /  9 @ 5 minutes.    Discharge labs  Lab Results   Component Value Date    WBC 7.49 2021    HGB 9.4 (L) 2021    HCT 29.2 (L) 2021     2021       Discharge Medications     Discharge Medications      New Medications      Instructions Start Date   ibuprofen 800 MG tablet  Commonly known as: ADVIL,MOTRIN   800 mg, Oral, Every 8 Hours PRN      oxyCODONE-acetaminophen 5-325 MG per tablet  Commonly known as: Percocet   1 tablet, Oral, Every 6 Hours PRN         Continue These Medications      Instructions Start Date   ferrous sulfate 325 (65 FE) MG tablet   325 mg, Oral, Daily With Breakfast      prenatal (CLASSIC) vitamin  tablet  Generic drug: prenatal vitamin   1 tablet, Oral, Daily             Discharge Disposition Home or Self Care   Condition on Discharge: good   Follow-up: 1 week with mehdi Rosenberg MD  12/3/2021

## 2021-12-06 NOTE — PAYOR COMM NOTE
"REF: [id number]    [facility]  [place],  [phone]  OR FAX  [phone]      Rosario Moya (21 y.o. Female)             Date of Birth Social Security Number Address Home Phone MRN    2000  [address] [facility] [phone] [id number]    Jewish Marital Status             Orthodox        Admission Date Admission Type Admitting Provider Attending Provider Department, Room/Bed    21 Elective Keith Medina MD  [facility] MOTHER BABY 2A, M239/    Discharge Date Discharge Disposition Discharge Destination          12/3/2021 Home or Self Care              Attending Provider: (none)   Allergies: No Known Allergies    Isolation: None   Infection: None   Code Status: Prior   Advance Care Planning Activity    Ht: 154.9 cm (61\")   Wt: 55.3 kg (122 lb)    Admission Cmt: None   Principal Problem: None                Active Insurance as of 2021     Primary Coverage     Payor Plan Insurance Group Employer/Plan Group    [place] [id number]     Payor Plan Address Payor Plan Phone Number Payor Plan Fax Number Effective Dates    [employer] [address] [address] [phone]  10/1/2021 - None Entered    [facility] [id number]       Subscriber Name Subscriber Birth Date Member ID       ROSARIO MOYA 2000 [id number]                 Emergency Contacts      (Rel.) Home Phone Work Phone Mobile Phone    PAULINA DOSS (Spouse) [phone] -- --               Discharge Summary      Odilon Rosenberg MD at 21 1037          Discharge Summary     Anna Moya  : 2000  MRN: [id number]  CSN: [id number]    Date of Admission: 2021   Date of Discharge:  12/3/2021   Delivering Physician: Odilon Rosenberg MD       Admission Diagnosis: 1. Status post primary low transverse  section [Z98.891]   Discharge Diagnosis: 1. Pregnancy at 41w1d - delivered       Procedures: " 1. Primary      Hospital Course  See the completed operative report for details regarding antepartum course and delivery.    Her post-operative course was unremarkable.  On POD # 2 she felt like she ready ready for D/C.  She was evaluated by Dr. rosenberg who agreed she was able to be discharged to home.  She had no febrile morbidity. She had normal bowel and bladder function and was hemodynamically stable.  Her wound was healing well without obvious signs of infections.    Infant  female  fetus weighing 3990 g (8 lb 12.7 oz)   Apgars -  8 @ 1 minute /  9 @ 5 minutes.    Discharge labs  Lab Results   Component Value Date    WBC 7.49 2021    HGB 9.4 (L) 2021    HCT 29.2 (L) 2021     2021       Discharge Medications     Discharge Medications      New Medications      Instructions Start Date   ibuprofen 800 MG tablet  Commonly known as: ADVIL,MOTRIN   800 mg, Oral, Every 8 Hours PRN      oxyCODONE-acetaminophen 5-325 MG per tablet  Commonly known as: Percocet   1 tablet, Oral, Every 6 Hours PRN         Continue These Medications      Instructions Start Date   ferrous sulfate 325 (65 FE) MG tablet   325 mg, Oral, Daily With Breakfast      prenatal (CLASSIC) vitamin  tablet  Generic drug: prenatal vitamin   1 tablet, Oral, Daily             Discharge Disposition Home or Self Care   Condition on Discharge: good   Follow-up: 1 week with mehdi Rosenberg MD  12/3/2021    Electronically signed by Odilon Rosenberg MD at 21 1038

## (undated) DEVICE — APPL CHLORAPREP HI/LITE 26ML ORNG

## (undated) DEVICE — INTENDED FOR TISSUE SEPARATION, AND OTHER PROCEDURES THAT REQUIRE A SHARP SURGICAL BLADE TO PUNCTURE OR CUT.: Brand: BARD-PARKER ® DISPOSABLE SCALPELS

## (undated) DEVICE — GOWN,PREVENTION PLUS,XLARGE,STERILE: Brand: MEDLINE

## (undated) DEVICE — PAD C-SECTION: Brand: MEDLINE INDUSTRIES, INC.

## (undated) DEVICE — SUT VIC PLS 0 CTX 36IN UD VCP978H

## (undated) DEVICE — SUT GUT CHRM 1 CTX 36IN 905H

## (undated) DEVICE — STPLR SKIN SUBCUTICULAR INSORB 2030

## (undated) DEVICE — ADHS LIQ MASTISOL 2/3ML

## (undated) DEVICE — 3M™ MEDIPORE™ H SOFT CLOTH SURGICAL TAPE 2868, 8 INCH X 10 YARD (20,3CM X 9,1M), 6 ROLLS/CASE: Brand: 3M™ MEDIPORE™

## (undated) DEVICE — Device

## (undated) DEVICE — GLV SURG SENSICARE SLT PF LF 7.5 STRL

## (undated) DEVICE — 3M™ STERI-STRIP™ REINFORCED ADHESIVE SKIN CLOSURES, R1547, 1/2 IN X 4 IN (12 MM X 100 MM), 6 STRIPS/ENVELOPE: Brand: 3M™ STERI-STRIP™